# Patient Record
Sex: FEMALE | URBAN - METROPOLITAN AREA
[De-identification: names, ages, dates, MRNs, and addresses within clinical notes are randomized per-mention and may not be internally consistent; named-entity substitution may affect disease eponyms.]

---

## 2018-12-18 ENCOUNTER — IMPORTED ENCOUNTER (OUTPATIENT)
Dept: URBAN - METROPOLITAN AREA CLINIC 9 | Facility: CLINIC | Age: 83
End: 2018-12-18

## 2020-08-31 NOTE — PATIENT DISCUSSION
Medications: Quality 130: Documentation Of Current Medications In The Medical Record: Current Medications with Name, Dosage, Frequency, or Route not Documented, Reason not Given Detail Level: Detailed Quality 226: Preventive Care And Screening: Tobacco Use: Screening And Cessation Intervention: Tobacco Screening not Performed for Unknown Reasons Quality 431: Preventive Care And Screening: Unhealthy Alcohol Use - Screening: Unhealthy alcohol use screening not performed, reason not otherwise specified

## 2020-08-31 NOTE — PATIENT DISCUSSION
Continue: [address] (brinzolamide-brimonidine): drops,suspension: 1-0.2% 1 drop once a day into both eyes

## 2020-08-31 NOTE — PATIENT DISCUSSION
COUNSELING:  I have talked with the patient about my impressions, explained our treatment plan, and have answered all questions.   IOP is at target

## 2021-01-26 NOTE — PATIENT DISCUSSION
Continue: ICaps AREDS2 (copper citrate) (vit c-vit b-jt-fi-lutein-zeax): tablet: 250 mg-200 unit-12.5 mg-1 mg 1 tablet once a day by mouth

## 2021-10-16 ASSESSMENT — KERATOMETRY
OS_AXISANGLE_DEGREES: 26
OS_K1POWER_DIOPTERS: 43.25
OD_K2POWER_DIOPTERS: 45.5
OD_AXISANGLE_DEGREES: 12
OS_K2POWER_DIOPTERS: 44
OD_K1POWER_DIOPTERS: 44
OS_AXISANGLE2_DEGREES: 116
OD_AXISANGLE2_DEGREES: 102

## 2021-10-16 ASSESSMENT — VISUAL ACUITY
OD_CC: 20/60 - SN
OD_SC: 20/30 - SN
OS_CC: 20/70 + SN
OS_SC: 20/80 - SN